# Patient Record
Sex: MALE | Race: WHITE | NOT HISPANIC OR LATINO | Employment: FULL TIME | URBAN - METROPOLITAN AREA
[De-identification: names, ages, dates, MRNs, and addresses within clinical notes are randomized per-mention and may not be internally consistent; named-entity substitution may affect disease eponyms.]

---

## 2019-08-29 PROBLEM — Z30.2 ENCOUNTER FOR STERILIZATION: Status: ACTIVE | Noted: 2019-08-29

## 2019-08-29 NOTE — PROGRESS NOTES
Referring Physician: No primary care provider on file  A copy of this consultation note was communicated to the referring physician  Diagnoses and all orders for this visit:    Encounter for sterilization  -     diazepam (VALIUM) 10 mg tablet; Take 1 tablet (10 mg total) by mouth once for 1 dose Take 1 hour prior to scheduled procedure  -     Vasectomy; Future            Assessment and plan:       We had a long discussion regarding the options for birth control  I told the patient that vasectomy is considered to be a permanent surgical sterilization procedure  We spoke about other options including the possibility of vasectomy reversal at a later time  He understands that vasectomy confers no immunity to STDs  I also told him that according to our present knowledge, there is no causal relationship between vasectomy and subsequent development of prostate cancer  We spoke about the potential complications  The most common one in the short term is scrotal hematoma and infection, which rarely requires re-operation  Additionally, he can react to the anesthetic, develop scrotal swelling, have pain or skin bruising  We spoke about post procedure care to try to minimize this complication  I also asked him to refrain from aspirin products and alcohol prior to the procedure  The long-term complications include but are not limited to vasectomy failure by recanalization, chronic epididymal discomfort, pain, among other possibilities  I described to him how this procedure is normally performed in an office setting and he understands that if anesthesia is desired, this can be performed for him in an outpatient operative setting  Finally, he understands that following vasectomy, hell need to use other means of birth control until hes had semen analyses that demonstrate the absence of sperm  He understands it will be his responsibility to submit these semen specimens and call our office for the results   I told him again that recanalization is a small but real possibility, and if he is ever concerned about it he can submit another semen specimen for analysis  After discussing the risks, benefits, possible complications and alternatives, informed consents were obtained  Diazepam was prescribed, and review dosing, adverse effects and timing of the procedure  He will return in the near future for the procedure  Chief Complaint     Desire for vasectomy      History of Present Illness     Starla Quintero is a 35 y o  male referred for evaluation of vasectomy  He has 2 biological children  He states that he and his partner have come to the mutual decision they do not desire any additional children  He has no prior past medical, past surgical, or past urologic history    Detailed Urologic History     - please refer to HPI    Review of Systems     Review of Systems   Constitutional: Negative for activity change and fatigue  HENT: Negative for congestion  Eyes: Negative for visual disturbance  Respiratory: Negative for shortness of breath and wheezing  Cardiovascular: Negative for chest pain and leg swelling  Gastrointestinal: Negative for abdominal pain  Endocrine: Negative for polyuria  Genitourinary: Negative for dysuria, flank pain, hematuria and urgency  Musculoskeletal: Negative for back pain  Allergic/Immunologic: Negative for immunocompromised state  Neurological: Negative for dizziness and numbness  Psychiatric/Behavioral: Negative for dysphoric mood  All other systems reviewed and are negative  Allergies     Allergies not on file    Physical Exam     Physical Exam   Constitutional: He is oriented to person, place, and time  He appears well-developed and well-nourished  No distress  HENT:   Head: Normocephalic and atraumatic  Eyes: EOM are normal    Neck: Normal range of motion     Cardiovascular:   Negative lower extremity edema   Pulmonary/Chest: Effort normal and breath sounds normal    Abdominal: Soft  Genitourinary:   Genitourinary Comments: Vas deferens are palpable bilaterally  Musculoskeletal: Normal range of motion  Neurological: He is alert and oriented to person, place, and time  Skin: Skin is warm  Psychiatric: He has a normal mood and affect  His behavior is normal          Vital Signs  There were no vitals filed for this visit  Current Medications     No current outpatient medications on file  Active Problems     Patient Active Problem List   Diagnosis    Encounter for sterilization         Past Medical History     No past medical history on file  Surgical History     No past surgical history on file  Family History     No family history on file  Social History     Social History     Social History     Tobacco Use   Smoking Status Not on file       Portions of the record may have been created with voice recognition software  Occasional wrong word or "sound a like" substitutions may have occurred due to the inherent limitations of voice recognition software  Read the chart carefully and recognize, using context, where substitutions have occurred

## 2019-08-30 ENCOUNTER — OFFICE VISIT (OUTPATIENT)
Dept: UROLOGY | Facility: CLINIC | Age: 34
End: 2019-08-30
Payer: COMMERCIAL

## 2019-08-30 VITALS
DIASTOLIC BLOOD PRESSURE: 80 MMHG | BODY MASS INDEX: 33.34 KG/M2 | HEART RATE: 76 BPM | SYSTOLIC BLOOD PRESSURE: 146 MMHG | WEIGHT: 259.8 LBS | HEIGHT: 74 IN

## 2019-08-30 DIAGNOSIS — Z30.2 ENCOUNTER FOR STERILIZATION: Primary | ICD-10-CM

## 2019-08-30 PROCEDURE — 99204 OFFICE O/P NEW MOD 45 MIN: CPT | Performed by: UROLOGY

## 2019-08-30 RX ORDER — DIAZEPAM 10 MG/1
10 TABLET ORAL ONCE
Qty: 1 TABLET | Refills: 0 | Status: SHIPPED | OUTPATIENT
Start: 2019-08-30 | End: 2019-10-30 | Stop reason: ALTCHOICE

## 2019-10-16 ENCOUNTER — PROCEDURE VISIT (OUTPATIENT)
Dept: UROLOGY | Facility: CLINIC | Age: 34
End: 2019-10-16
Payer: COMMERCIAL

## 2019-10-16 VITALS
SYSTOLIC BLOOD PRESSURE: 146 MMHG | HEART RATE: 84 BPM | HEIGHT: 74 IN | BODY MASS INDEX: 33.88 KG/M2 | DIASTOLIC BLOOD PRESSURE: 78 MMHG | WEIGHT: 264 LBS

## 2019-10-16 DIAGNOSIS — Z30.2 ENCOUNTER FOR STERILIZATION: Primary | ICD-10-CM

## 2019-10-16 PROCEDURE — 88302 TISSUE EXAM BY PATHOLOGIST: CPT | Performed by: PATHOLOGY

## 2019-10-16 PROCEDURE — 55250 REMOVAL OF SPERM DUCT(S): CPT | Performed by: UROLOGY

## 2019-10-16 NOTE — PROGRESS NOTES
Procedures      No Scalpel Vasectomy Procedure Note    Indications: 35 y o   y o  male desiring permanent sterilization    Pre-operative Diagnosis: Undesired fertility    Post-operative Diagnosis: Undesired fertility    Anesthesia: Lidocaine 2% without epinephrine      Procedure Details     The risks and benefits of the procedure were discussed at the pre-procedure consultation, and written, informed consent obtained  Premedicated with Valium 10 mgm po 60 minutes prior to procedure  The scrotum was palpated with both testes normal in size and position, no masses palpitated  The scrotum was cleansed with warm Betadine and draped in the usual sterile manner  A vasal sheath block was performed on both the left and right vas  After adequate anesthesia was established, a small perforation was made in the skin and the left vas was isolated with the ring forceps, dissected free and delivered through the skin perforation  The left vas was divided, approximately 1 5 cm portion removed, and each end of the vas was cauterized and suture ligated  The ends of the vas were replaced in the scrotum through the puncture site  The right vas was then isolated, divided, cauterized and ligated in a similar fashion  Midportions removed were sent to pathology to confirm  Any bleeding was controlled with electrocautery  The puncture site was dry when the procedure was completed  After discussion with the patient, the puncture site was sutured using single 5-0 chromic suture in figure 8 fashion  Specimen:   1  Right vas deferens  2  Left vas deferens    Condition: Stable    Complications: none    Plan:        He did very well with the procedure today  Postprocedural instructions were provided  He will follow-up in 2-3 weeks for postoperative assessment  At that time we will coordinate his postprocedural semen analyses at 6 weeks, and again it 8 weeks after the procedure   He was instructed to call my office 2 weeks after his second specimen is submitted to review the results by phone  He was instructed to continue his current methods of contraception until that time

## 2019-10-28 NOTE — PROGRESS NOTES
10/30/2019  Meeta Patricia  1985  [id number]      Assessment/Plan  S/p vasectomy 10/16/2019    Discussion  Meeta Patricia is a 35 y o  male being managed by Dr Marco Antonio Cano  The patient is doing well with no complaints  He was provided verbal and written instructions regarding semen analysis testing  He was instructed to use contraception until sterility confirmed with 2 semen analysis  He will call to review results  He will follow up on an as needed basis  All questions were answered  History of Present Illness  35 y o  male s/p vasectomy (10/16/2019), presents today for follow up  He denies any scrotal pain or swelling  He is doing well with no issues after surgery  Vitals  There were no vitals filed for this visit  Current Medications  Current Outpatient Medications   Medication Sig Dispense Refill    diazepam (VALIUM) 10 mg tablet Take 1 tablet (10 mg total) by mouth once for 1 dose Take 1 hour prior to scheduled procedure 1 tablet 0     No current facility-administered medications for this visit          Physical Exam  Gu: scrotum midline/bilateral incisions c/d/i

## 2019-10-30 ENCOUNTER — OFFICE VISIT (OUTPATIENT)
Dept: UROLOGY | Facility: CLINIC | Age: 34
End: 2019-10-30

## 2019-10-30 VITALS
BODY MASS INDEX: 33.75 KG/M2 | HEIGHT: 74 IN | SYSTOLIC BLOOD PRESSURE: 134 MMHG | HEART RATE: 82 BPM | DIASTOLIC BLOOD PRESSURE: 68 MMHG | WEIGHT: 263 LBS

## 2019-10-30 DIAGNOSIS — Z30.2 ENCOUNTER FOR STERILIZATION: Primary | ICD-10-CM

## 2019-10-30 PROCEDURE — 99024 POSTOP FOLLOW-UP VISIT: CPT | Performed by: PHYSICIAN ASSISTANT

## 2019-11-27 ENCOUNTER — APPOINTMENT (OUTPATIENT)
Dept: LAB | Facility: HOSPITAL | Age: 34
End: 2019-11-27
Payer: COMMERCIAL

## 2019-11-27 ENCOUNTER — TELEPHONE (OUTPATIENT)
Dept: UROLOGY | Facility: AMBULATORY SURGERY CENTER | Age: 34
End: 2019-11-27

## 2019-11-27 DIAGNOSIS — Z30.2 ENCOUNTER FOR STERILIZATION: ICD-10-CM

## 2019-11-27 LAB
DEPRECATED CD4 CELLS/CD8 CELLS BLD: 2.8 ML
SPERM MOTILE SMN QL MICRO: NORMAL

## 2019-11-27 PROCEDURE — 89321 SEMEN ANAL SPERM DETECTION: CPT

## 2019-12-11 ENCOUNTER — APPOINTMENT (OUTPATIENT)
Dept: LAB | Facility: CLINIC | Age: 34
End: 2019-12-11
Payer: COMMERCIAL

## 2019-12-11 ENCOUNTER — TELEPHONE (OUTPATIENT)
Dept: UROLOGY | Facility: CLINIC | Age: 34
End: 2019-12-11

## 2019-12-11 DIAGNOSIS — Z30.2 ENCOUNTER FOR STERILIZATION: ICD-10-CM

## 2019-12-11 LAB
DEPRECATED CD4 CELLS/CD8 CELLS BLD: 2 ML
SPERM MOTILE SMN QL MICRO: ABNORMAL

## 2019-12-11 PROCEDURE — 89321 SEMEN ANAL SPERM DETECTION: CPT

## 2019-12-11 NOTE — TELEPHONE ENCOUNTER
----- Message from Amalia Quiles PA-C sent at 12/11/2019  1:08 PM EST -----  Please let him know that he can be considered sterile at this point  Congratulations and Happy Denilson Olson!

## 2019-12-11 NOTE — TELEPHONE ENCOUNTER
Called and left message for patient (per communication consent)  In message stated that per Shanique Call CRISTIANO that he is considered sterile at this point  Patient is to call with any questions or concerns  Office number was left in the message

## 2021-12-15 ENCOUNTER — APPOINTMENT (OUTPATIENT)
Dept: RADIOLOGY | Facility: AMBULARY SURGERY CENTER | Age: 36
End: 2021-12-15
Attending: ORTHOPAEDIC SURGERY
Payer: COMMERCIAL

## 2021-12-15 VITALS — WEIGHT: 264 LBS | HEIGHT: 74 IN | BODY MASS INDEX: 33.88 KG/M2

## 2021-12-15 DIAGNOSIS — M17.11 PATELLOFEMORAL ARTHRITIS OF RIGHT KNEE: Primary | ICD-10-CM

## 2021-12-15 DIAGNOSIS — M25.561 RIGHT KNEE PAIN, UNSPECIFIED CHRONICITY: ICD-10-CM

## 2021-12-15 PROCEDURE — 73562 X-RAY EXAM OF KNEE 3: CPT

## 2021-12-15 PROCEDURE — 99203 OFFICE O/P NEW LOW 30 MIN: CPT | Performed by: ORTHOPAEDIC SURGERY

## 2022-01-24 ENCOUNTER — OFFICE VISIT (OUTPATIENT)
Dept: OBGYN CLINIC | Facility: CLINIC | Age: 37
End: 2022-01-24
Payer: COMMERCIAL

## 2022-01-24 VITALS — WEIGHT: 264 LBS | HEIGHT: 74 IN | BODY MASS INDEX: 33.88 KG/M2

## 2022-01-24 DIAGNOSIS — M17.11 PATELLOFEMORAL ARTHRITIS OF RIGHT KNEE: Primary | ICD-10-CM

## 2022-01-24 PROCEDURE — 99213 OFFICE O/P EST LOW 20 MIN: CPT | Performed by: ORTHOPAEDIC SURGERY

## 2022-01-24 NOTE — PROGRESS NOTES
Assessment/Plan:  1  Patellofemoral arthritis of right knee       Patient Active Problem List   Diagnosis    Encounter for sterilization    Patellofemoral arthritis of right knee       Discussion/Summary:    39 y o  male  with mild right knee patellofemoral arthritis, remote history of meniscal repair 20 years ago without evidence of recurrent tear     Patient will begin weaning off of the tape   At the patient's request a J brace was provided   Patient is doing much better and at this time will not consider any Visco injections   If the patient's pain returns or persists despite taping and bracing then will let us know we will order Visco injections for the knee   Follow-up p r n    The assessment and plan were formulated by Dr Romana Prose and I assisted in carrying it out  Subjective:   Patient ID: Vivi Talamantes is a 39 y o  male   HPI    Patient presents to the office for follow up of right knee patellofemoral arthritis  Since the last visit, the patient reports 90% improvement in his knee pain from taping alone  Still has some mild intermittent pain  Notes some popping in the anterior knee at times but not painful  Some mild swelling the anterior knee that is intermittent  Denies any new injuries to the knee  No locking of the knee        The following portions of the patient's history were reviewed and updated as appropriate: allergies, current medications, past family history, past social history, past surgical history and problem list     Social History     Socioeconomic History    Marital status: /Civil Union     Spouse name: Not on file    Number of children: Not on file    Years of education: Not on file    Highest education level: Not on file   Occupational History    Not on file   Tobacco Use    Smoking status: Never Smoker    Smokeless tobacco: Current User   Substance and Sexual Activity    Alcohol use: Yes     Comment: 3 drinks per week    Drug use: Never    Sexual activity: Not on file   Other Topics Concern    Not on file   Social History Narrative    Not on file     Social Determinants of Health     Financial Resource Strain: Not on file   Food Insecurity: Not on file   Transportation Needs: Not on file   Physical Activity: Not on file   Stress: Not on file   Social Connections: Not on file   Intimate Partner Violence: Not on file   Housing Stability: Not on file     History reviewed  No pertinent past medical history  Past Surgical History:   Procedure Laterality Date    KNEE SURGERY Right     ages 12 and 16     No Known Allergies  No current outpatient medications on file prior to visit  No current facility-administered medications on file prior to visit  Review of Systems  See HPi    Objective: There were no vitals filed for this visit  Physical Exam  Constitutional:       General: He is not in acute distress  Appearance: He is well-developed  HENT:      Head: Normocephalic and atraumatic  Eyes:      General: No scleral icterus  Conjunctiva/sclera: Conjunctivae normal    Neck:      Trachea: No tracheal deviation  Cardiovascular:      Comments: No discernible arrhthymias   Pulmonary:      Effort: Pulmonary effort is normal  No respiratory distress  Musculoskeletal:      Cervical back: Neck supple  Right knee: No effusion  Skin:     General: Skin is warm and dry  Neurological:      Mental Status: He is alert  Psychiatric:         Behavior: Behavior normal          Right Knee Exam     Tenderness   The patient is experiencing tenderness in the patella  Range of Motion   The patient has normal right knee ROM  Tests   Varus: negative Valgus: negative  Patellar apprehension: negative    Other   Erythema: absent  Scars: present  Sensation: normal  Pulse: present  Swelling: mild (Mild swelling Hoffa's fat pad)  Effusion: no effusion present            I have personally reviewed pertinent films in PACS      Procedures  No Procedures performed today    Portions of the record may have been created with voice recognition software  Occasional wrong word or "sound a like" substitutions may have occurred due to the inherent limitations of voice recognition software  Read the chart carefully and recognize, using context, where substitutions have occurred